# Patient Record
Sex: FEMALE | Race: WHITE | NOT HISPANIC OR LATINO | Employment: UNEMPLOYED | URBAN - NONMETROPOLITAN AREA
[De-identification: names, ages, dates, MRNs, and addresses within clinical notes are randomized per-mention and may not be internally consistent; named-entity substitution may affect disease eponyms.]

---

## 2017-03-04 ENCOUNTER — OFFICE VISIT (OUTPATIENT)
Dept: RETAIL CLINIC | Facility: CLINIC | Age: 57
End: 2017-03-04

## 2017-03-04 VITALS
OXYGEN SATURATION: 98 % | SYSTOLIC BLOOD PRESSURE: 120 MMHG | RESPIRATION RATE: 16 BRPM | HEART RATE: 68 BPM | TEMPERATURE: 98.3 F | DIASTOLIC BLOOD PRESSURE: 80 MMHG

## 2017-03-04 DIAGNOSIS — J01.00 ACUTE NON-RECURRENT MAXILLARY SINUSITIS: Primary | ICD-10-CM

## 2017-03-04 PROCEDURE — 99212 OFFICE O/P EST SF 10 MIN: CPT | Performed by: NURSE PRACTITIONER

## 2017-03-04 RX ORDER — AMOXICILLIN AND CLAVULANATE POTASSIUM 875; 125 MG/1; MG/1
1 TABLET, FILM COATED ORAL EVERY 12 HOURS SCHEDULED
Status: SHIPPED | OUTPATIENT
Start: 2017-03-04 | End: 2017-03-14

## 2017-03-04 RX ORDER — PAROXETINE HYDROCHLORIDE 20 MG/1
20 TABLET, FILM COATED ORAL EVERY MORNING
COMMUNITY

## 2017-03-04 RX ORDER — BUPROPION HYDROCHLORIDE 300 MG/1
TABLET ORAL DAILY
COMMUNITY
Start: 2013-04-30

## 2017-03-04 RX ORDER — BUPROPION HYDROCHLORIDE 150 MG/1
150 TABLET, EXTENDED RELEASE ORAL 2 TIMES DAILY
COMMUNITY
End: 2017-03-04

## 2017-03-04 NOTE — PROGRESS NOTES
Subjective   Nerissa Hopper is a 56 y.o. female.   Pt presents today with C/O sinus D/, pressure, sore throat, and tender lymph nodes.  Onset 2 weeks ago.  Has mild sinus pressure.  Has occ sinjus HA.  Ears popping and cracking.  Has nonproductive.  Denies fever or chills.  Not Taking anything  For S/S.  Has felt tired and run down.  Cannot remember when she had her last blood work.      History of Present Illness See Subjective    Home Meds Paxil and wellbutrin    Allergies   Allergen Reactions   • No Known Drug Allergy        Past Medical History   Diagnosis Date   • ADD (attention deficit disorder)    • Arthritis    • Autoimmune disorder    • DONAVAN (generalized anxiety disorder)    • Headache    • Hepatitis C      ?from blood transfusion  had interferon Therapy   • History of blood transfusion    • Sjogren's syndrome        Past Surgical History   Procedure Laterality Date   • Abdominal surgery       Repair of peptic Ulcer       Family History   Problem Relation Age of Onset   • Heart disease Mother    • Lung disease Mother    • Cancer Mother    • Cancer Father    • Heart disease Father        Social History     Social History   • Marital status:      Spouse name: N/A   • Number of children: N/A   • Years of education: N/A     Occupational History   • Homemaker      Social History Main Topics   • Smoking status: Never Smoker   • Smokeless tobacco: Never Used   • Alcohol use Not on file      Comment: 2 drinks per week   • Drug use: No   • Sexual activity: Not on file     Other Topics Concern   • Not on file     Social History Narrative   • No narrative on file       Review of Systems   Constitutional: Positive for fatigue. Negative for chills, diaphoresis and fever.   HENT: Positive for congestion, ear pain, sinus pressure and sore throat. Negative for trouble swallowing and voice change.    Respiratory: Negative for shortness of breath and wheezing.    Cardiovascular: Negative.    Gastrointestinal: Negative for  abdominal pain, diarrhea, nausea and vomiting.       Visit Vitals   • /80   • Pulse 68   • Temp 98.3 °F (36.8 °C) (Oral)   • Resp 16   • SpO2 98%       Objective   Physical Exam   Constitutional: She is oriented to person, place, and time. She appears well-developed and well-nourished. No distress.   HENT:   Head: Normocephalic.   TM dull  Tender over maxillary sinuses bilat.  Throat clear   Eyes: Right eye exhibits no discharge. Left eye exhibits no discharge. No scleral icterus.   Neck: Neck supple.   Cardiovascular: Normal rate, regular rhythm and normal heart sounds.  Exam reveals no gallop and no friction rub.    No murmur heard.  Pulmonary/Chest: Effort normal and breath sounds normal. No respiratory distress. She has no wheezes. She has no rales. She exhibits no tenderness.   Lymphadenopathy:     She has no cervical adenopathy.   Neurological: She is alert and oriented to person, place, and time.   Skin: Skin is warm and dry.   No rash   Psychiatric: She has a normal mood and affect. Her behavior is normal. Judgment and thought content normal.   Nursing note and vitals reviewed.      No results found for this or any previous visit.  Assessment/Plan   Nerissa was seen today for sinusitis.    Diagnoses and all orders for this visit:    Acute non-recurrent maxillary sinusitis  -     amoxicillin-clavulanate (AUGMENTIN) 875-125 MG per tablet 1 tablet; Take 1 tablet by mouth Every 12 (Twelve) Hours.    Sudafed for sinus pressure.  Augmentin as discussed  See Dr quintana to discuss you chronic health issues

## 2017-03-04 NOTE — PATIENT INSTRUCTIONS
Drink plenty of fluids.  Augmentin as discussed.  Sudafed PE every 6 hours as needed for nasal congestion.  See Dr Godinez for F/U to discussed your chronic health issues.